# Patient Record
Sex: FEMALE | Race: WHITE
[De-identification: names, ages, dates, MRNs, and addresses within clinical notes are randomized per-mention and may not be internally consistent; named-entity substitution may affect disease eponyms.]

---

## 2018-04-10 ENCOUNTER — HOSPITAL ENCOUNTER (INPATIENT)
Dept: HOSPITAL 17 - NEPC | Age: 83
LOS: 1 days | Discharge: HOME HEALTH | DRG: 563 | End: 2018-04-11
Attending: HOSPITALIST | Admitting: HOSPITALIST
Payer: MEDICARE

## 2018-04-10 VITALS — BODY MASS INDEX: 26.56 KG/M2 | HEIGHT: 63 IN | WEIGHT: 149.91 LBS

## 2018-04-10 VITALS
OXYGEN SATURATION: 98 % | TEMPERATURE: 98.3 F | RESPIRATION RATE: 18 BRPM | SYSTOLIC BLOOD PRESSURE: 168 MMHG | DIASTOLIC BLOOD PRESSURE: 74 MMHG | HEART RATE: 91 BPM

## 2018-04-10 DIAGNOSIS — Z96.643: ICD-10-CM
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
## 2018-04-10 DIAGNOSIS — Z96.653: ICD-10-CM

## 2018-04-10 DIAGNOSIS — F03.90: ICD-10-CM

## 2018-04-10 DIAGNOSIS — S42.201A: Primary | ICD-10-CM

## 2018-04-10 DIAGNOSIS — I10: ICD-10-CM

## 2018-04-10 DIAGNOSIS — M19.90: ICD-10-CM
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
## 2018-04-10 PROCEDURE — 85730 THROMBOPLASTIN TIME PARTIAL: CPT

## 2018-04-10 PROCEDURE — 73030 X-RAY EXAM OF SHOULDER: CPT

## 2018-04-10 PROCEDURE — 86900 BLOOD TYPING SEROLOGIC ABO: CPT

## 2018-04-10 PROCEDURE — 71045 X-RAY EXAM CHEST 1 VIEW: CPT

## 2018-04-10 PROCEDURE — 86850 RBC ANTIBODY SCREEN: CPT

## 2018-04-10 PROCEDURE — 85610 PROTHROMBIN TIME: CPT

## 2018-04-10 PROCEDURE — 80048 BASIC METABOLIC PNL TOTAL CA: CPT

## 2018-04-10 PROCEDURE — 86901 BLOOD TYPING SEROLOGIC RH(D): CPT

## 2018-04-10 PROCEDURE — 99285 EMERGENCY DEPT VISIT HI MDM: CPT

## 2018-04-10 PROCEDURE — 85025 COMPLETE CBC W/AUTO DIFF WBC: CPT

## 2018-04-11 VITALS
SYSTOLIC BLOOD PRESSURE: 138 MMHG | TEMPERATURE: 98 F | RESPIRATION RATE: 19 BRPM | OXYGEN SATURATION: 98 % | HEART RATE: 53 BPM | DIASTOLIC BLOOD PRESSURE: 62 MMHG

## 2018-04-11 VITALS
TEMPERATURE: 97.6 F | RESPIRATION RATE: 20 BRPM | HEART RATE: 65 BPM | SYSTOLIC BLOOD PRESSURE: 165 MMHG | OXYGEN SATURATION: 98 % | DIASTOLIC BLOOD PRESSURE: 73 MMHG

## 2018-04-11 VITALS
OXYGEN SATURATION: 97 % | RESPIRATION RATE: 16 BRPM | HEART RATE: 60 BPM | TEMPERATURE: 97.9 F | SYSTOLIC BLOOD PRESSURE: 159 MMHG | DIASTOLIC BLOOD PRESSURE: 71 MMHG

## 2018-04-11 LAB
BASOPHILS # BLD AUTO: 0 TH/MM3 (ref 0–0.2)
BASOPHILS NFR BLD: 0.4 % (ref 0–2)
BUN SERPL-MCNC: 23 MG/DL (ref 7–18)
CALCIUM SERPL-MCNC: 9.3 MG/DL (ref 8.5–10.1)
CHLORIDE SERPL-SCNC: 104 MEQ/L (ref 98–107)
CREAT SERPL-MCNC: 0.76 MG/DL (ref 0.5–1)
EOSINOPHIL # BLD: 0.1 TH/MM3 (ref 0–0.4)
EOSINOPHIL NFR BLD: 0.7 % (ref 0–4)
ERYTHROCYTE [DISTWIDTH] IN BLOOD BY AUTOMATED COUNT: 13.6 % (ref 11.6–17.2)
GFR SERPLBLD BASED ON 1.73 SQ M-ARVRAT: 72 ML/MIN (ref 89–?)
GLUCOSE SERPL-MCNC: 136 MG/DL (ref 74–106)
HCO3 BLD-SCNC: 28.1 MEQ/L (ref 21–32)
HCT VFR BLD CALC: 38.5 % (ref 35–46)
HGB BLD-MCNC: 13 GM/DL (ref 11.6–15.3)
INR PPP: 1 RATIO
LYMPHOCYTES # BLD AUTO: 0.8 TH/MM3 (ref 1–4.8)
LYMPHOCYTES NFR BLD AUTO: 7.9 % (ref 9–44)
MCH RBC QN AUTO: 29.9 PG (ref 27–34)
MCHC RBC AUTO-ENTMCNC: 33.8 % (ref 32–36)
MCV RBC AUTO: 88.6 FL (ref 80–100)
MONOCYTE #: 0.5 TH/MM3 (ref 0–0.9)
MONOCYTES NFR BLD: 4.4 % (ref 0–8)
NEUTROPHILS # BLD AUTO: 9.2 TH/MM3 (ref 1.8–7.7)
NEUTROPHILS NFR BLD AUTO: 86.6 % (ref 16–70)
PLATELET # BLD: 175 TH/MM3 (ref 150–450)
PMV BLD AUTO: 8.8 FL (ref 7–11)
PROTHROMBIN TIME: 10.4 SEC (ref 9.8–11.6)
RBC # BLD AUTO: 4.34 MIL/MM3 (ref 4–5.3)
SODIUM SERPL-SCNC: 139 MEQ/L (ref 136–145)
WBC # BLD AUTO: 10.6 TH/MM3 (ref 4–11)

## 2018-04-11 NOTE — PD.ORT.PN
Subjective


Subjective Remarks


s/p fall while square dancing


right shoulder pain. no other complaints.





Objective


Vitals





Vital Signs








  Date Time  Temp Pulse Resp B/P (MAP) Pulse Ox O2 Delivery O2 Flow Rate FiO2


 


4/11/18 06:06        


 


4/11/18 05:24 97.6 65 20 165/73 (103) 98   


 


4/10/18 22:15 98.3 91 18 168/74 (105) 98   








Result Diagram:  


4/11/18 0410                                                                   

             4/11/18 0410





Other Results





Laboratory Tests








Test


  4/11/18


04:10


 


Prothromb Time International


Ratio 1.0 RATIO 


 


 


Prothrombin Time


  10.4 SEC


(9.8-11.6)








Imaging





Last 24 hours Impressions








Chest X-Ray 4/11/18 0000 Signed





Impressions: 





 Service Date/Time:  Wednesday, April 11, 2018 03:44 - CONCLUSION:  No acute 





 disease.  Cardiomegaly.     Eleno Kwan MD 


 


Shoulder X-Ray 4/10/18 2221 Signed





Impressions: 





 Service Date/Time:  Wednesday, April 11, 2018 00:24 - CONCLUSION:  Displaced 





 fracture proximal humeral shaft.     Eleno Kwan MD 








Objective Remarks


RUE: +sling. nvi distally with full senation to median/ulnar nerve. full 

extension of wrist and fingers





Assessment & Plan


Assessment and Plan


1) Right Proximal Humerus Fx


   -plan for nonop treatment


   -NWb


   -sling at all times


   -f/u wtih Velazco next thursday to eval alignment.


   -if maintains alignment, plan for nonop treatment


   -ortho clear for Dc


   -official consult to follow











Lawrence Caruso/First Assist PA Apr 11, 2018 07:32

## 2018-04-11 NOTE — RADRPT
EXAM DATE/TIME:  04/11/2018 00:24 

 

[facility] COMPARISON:     

No previous studies available for comparison.

 

                     

INDICATIONS :     

Fell dancing on right arm.

                     

 

MEDICAL HISTORY :     

None.          

 

SURGICAL HISTORY :     

None.   

 

ENCOUNTER:     

Initial                                        

 

ACUITY:     

1 day      

 

PAIN SCORE:     

9/10

 

LOCATION:     

Right  shoulder

 

FINDINGS:     

Two view examination of the right shoulder demonstrates a displaced fracture or proximal shaft right 
humerus. Humeral head continues to articulate with the glenoid. Bony mineralization is normal.

 

CONCLUSION:     

Displaced fracture proximal humeral shaft.

 

 

 

 Eleno Kwan MD on April 11, 2018 at 0:38           

Board Certified Radiologist.

 This report was verified electronically.

## 2018-04-11 NOTE — MB
cc:

Lawrence Caruso PA/[facility]ist

Lawrence Caruso/Eddi

****

 

 

DATE:

04/11/2018

 

DATE OF SERVICE:

04/11/2018.

 

HISTORY OF PRESENT ILLNESS:

The patient is an 88-year-old white female who presents today after 

suffering a fall while dancing.  She states she fell on her right side

and had immediate right shoulder pain.  She states that since then she

has been unable to move her arm.  She states that she has been in her 

sling since then.  She was brought to the emergency department where 

she was diagnosed with a right proximal humerus fracture.  She states 

that she has pain in the right arm.  She denies pain anywhere else.  

She says her legs and left arm are feeling quite good.  She states she

is able to walk.  She denies any numbness, tingling or radiation of 

symptoms.  Denies any loss of consciousness or dizziness.  She states 

that she experiences increased pain whenever she attempts to move her 

arm.  She states that she and her  are actually from [place]

and will be traveling back there at the beginning of May.

 

REVIEW OF SYSTEMS:

A complete 9-point review of systems is negative, except for what is 

mentioned in the HPI.

 

PAST MEDICAL HISTORY:

Positive for hypertension, mild dementia.

 

PAST SURGICAL HISTORY:

Bilateral knee surgery and hip surgery, five C-sections.

 

SOCIAL HISTORY:

Drinks wine occasionally.  Denies tobacco or substance abuse.

 

ALLERGIES:

NO KNOWN ALLERGIES.

 

REPORTED MEDICATIONS:

1.  MiraLax.

2.  Calcium.

3.  Aspirin.

4.  Metoprolol.

 

PHYSICAL EXAMINATION:

VITAL SIGNS:  Temperature 97.9, pulse 60 beats per minute, respiratory

rate 16, blood pressure 159/71, O2 saturation 97% on room air.

GENERAL:  Well-developed, well-nourished 88-year-old white female, 

resting comfortably, in no acute distress.

HEAD:  Normocephalic, atraumatic.

EARS:  Hearing intact bilaterally.

EYES:  Extraocular motions intact.  Pupils equal, round and reactive 

to light.

Cranial nerves 2-12 are grossly intact.

NECK:  Supple.  No evidence of lymphadenopathy.

LUNGS:  No audible wheezes at bedside and no use of accessory muscles 

of breathing.

HEART:  No grade 4 murmur present at bedside.

ABDOMEN:  Soft, nontender.

MUSCULOSKELETAL:  Right upper extremity sling is present.  There is 

bruising and swelling noted in the right arm.  She has pain with 

movement of the shoulder.  She has full motion of the elbow, wrist and

fingers with minimal discomfort with full sensation to median and 

ulnar nerve distribution and full extension of the wrist and fingers.

 

The left upper extremity shows full motion of shoulder, elbow, wrist, 

and fingers and no pain with full sensation distally.

 

LABORATORIES:

White blood cell count 10.6, red blood cell count 4.34, hemoglobin 13,

hematocrit 38.5.

INR 1.0.

 

IMAGING:

X-rays were reviewed from [facility], which include a 

2-view x-ray of the right shoulder which shows a minimally displaced 

right proximal humerus fracture.

 

ASSESSMENT:

Right proximal humerus fracture.

 

PLAN:

At this point, treatment options were discussed with the patient, 

which include surgical versus nonsurgical options.  Given her age and 

how well the fracture is aligned, I would recommend nonoperative 

management at this time.  I informed the patient and her  that 

proximal humerus fractures do tend to heal well.  This is something 

that should do well with conservative management.  She will remain 

100% nonweightbearing on the right arm.  She will maintain her sling 

at all times.  She will avoid any motion.

 

She will follow up with us next Thursday for a recheck and x-rays of 

the right shoulder.  I informed her that if it does happen to displace

it could necessitate surgical intervention.  However, if it maintains 

its current alignment, this should do well conservatively.  She may be

discharged from an orthopedic standpoint today and we will be signing 

off and following up with her in a week.  The patient understood and 

all questions were answered.

 

The above patient and dictation was reviewed and discussed with Dr. Velazco and he agrees.

 

 

__________________________________ __________________________________

CLARICE Jha, PA/First Chandana Velazco MD

Assist

 

 

TYB/SB

D: 04/11/2018, 10:46 AM

T: 04/11/2018, 11:13 AM

Visit #: [id number]

Job #: [id number]

## 2018-04-11 NOTE — HHI.HP
__________________________________________________





[facility]


Service


[facility]


Primary Care Physician


No Primary Care Physician


Admission Diagnosis





R humerus fracture


Diagnoses:  


Travel History


International Travel<30 Days:  No


Contact w/Intl Traveler <30 Da:  No


Traveled to Known Affected Are:  No


History of Present Illness


Patient fell yesterday evening, while line dancing with her .  She was 

drooling, lost balance.  She denies any loss of consciousness.  Denies hitting 

her head.  Constant nonradiating, sharp pain in the right shoulder, improved 

with narcotics.  She denies any chest pain, shortness of breath, nausea, 

vomiting, diarrhea, dysuria, cough, cold.  She denies chronic constipation 

without acute exacerbation.  She says she otherwise feels all right.





Review of Systems


Except as stated in HPI:  all other systems reviewed are Neg





Past Family Social History


Past Medical History


Hypertension


Chronic constipation.


Osteoarthritis


Past Surgical History


Bilateral knee replacements


Bilateral hip replacements


 5


Reported Medications


Reported Meds & Active Scripts


Active


Reported


Miralax Powder (Polyethylene Glycol 3350 Powder) 17 Gm Powd 17 Gm PO DAILY


     Mix and dissolve one measuring cap-ful (17 grams) in water or juice.


Calcium 600+D 200 (Calcium Carbonate-Vitamin D) 600-200 Mg-Unit Tab 1 Tab PO BID


Aspirin EC (Aspirin) 325 Mg Tabdr 325 Mg PO DAILY


Metoprolol Tartrate 25 Mg Tab 25 Mg PO DAILY


Allergies:  


Coded Allergies:  


     No Known Allergies (Unverified , 4/10/18)


Family History


Patient reports both parents  in their late 90s secondary to old age.


Social History





Non-smoker.  Occasional drinker.  Denies any illicit drugs.





Physical Exam


Vital Signs





Vital Signs








  Date Time  Temp Pulse Resp B/P (MAP) Pulse Ox O2 Delivery O2 Flow Rate FiO2


 


18 12:00 98.0 53 19 138/62 (87) 98   


 


18 08:00 97.9 60 16 159/71 (100) 97   


 


18 06:06        


 


18 05:24 97.6 65 20 165/73 (103) 98   


 


4/10/18 22:15 98.3 91 18 168/74 (105) 98   








Physical Exam


GENERAL: This is a well-nourished, well-developed patient, in no apparent 

distress.


SKIN: No rashes, ecchymoses or lesions. Cool and dry.


HEAD: Atraumatic. Normocephalic. No temporal or scalp tenderness.


EYES: Pupils equal round and reactive. Extraocular motions intact. No scleral 

icterus. No injection or drainage. 


ENT: Nose without bleeding, purulent drainage or septal hematoma. Throat 

without erythema, tonsillar hypertrophy or exudate. Uvula midline. Airway 

patent.


NECK: Trachea midline. No JVD or lymphadenopathy. Supple, nontender, no 

meningeal signs.


CARDIOVASCULAR: Regular rate and rhythm without murmurs, gallops, or rubs. 


RESPIRATORY: Clear to auscultation. Breath sounds equal bilaterally. No wheezes

, rales, or rhonchi.  


GASTROINTESTINAL: Abdomen soft, non-tender, nondistended. No hepato-splenomegaly

, or palpable masses. No guarding.


MUSCULOSKELETAL: Extremities without clubbing, cyanosis. No joint tenderness, 

effusion.  1+ bilateral lower extremity edema without any erythema.  Patient 

says this is chronic.  No calf tenderness. Negative Homans sign bilaterally.  

Right shoulder immobilized, no tenderness to light palpation.  Hand strength 

intact.  Peripheral perfusion intact.


NEUROLOGICAL: Awake and alert. Cranial nerves II through XII intact.  Motor and 

sensory grossly within normal limits. Five out of 5 muscle strength in 

bilateral lower extremities.  Normal speech.


Laboratory





Laboratory Tests








Test


  18


04:10


 


White Blood Count 10.6 


 


Red Blood Count 4.34 


 


Hemoglobin 13.0 


 


Hematocrit 38.5 


 


Mean Corpuscular Volume 88.6 


 


Mean Corpuscular Hemoglobin 29.9 


 


Mean Corpuscular Hemoglobin


Concent 33.8 


 


 


Red Cell Distribution Width 13.6 


 


Platelet Count 175 


 


Mean Platelet Volume 8.8 


 


Neutrophils (%) (Auto) 86.6 


 


Lymphocytes (%) (Auto) 7.9 


 


Monocytes (%) (Auto) 4.4 


 


Eosinophils (%) (Auto) 0.7 


 


Basophils (%) (Auto) 0.4 


 


Neutrophils # (Auto) 9.2 


 


Lymphocytes # (Auto) 0.8 


 


Monocytes # (Auto) 0.5 


 


Eosinophils # (Auto) 0.1 


 


Basophils # (Auto) 0.0 


 


CBC Comment DIFF FINAL 


 


Differential Comment  


 


Prothrombin Time 10.4 


 


Prothromb Time International


Ratio 1.0 


 


 


Activated Partial


Thromboplast Time 25.2 


 


 


Blood Urea Nitrogen 23 


 


Creatinine 0.76 


 


Random Glucose 136 


 


Calcium Level 9.3 


 


Sodium Level 139 


 


Potassium Level 3.8 


 


Chloride Level 104 


 


Carbon Dioxide Level 28.1 


 


Anion Gap 7 


 


Estimat Glomerular Filtration


Rate 72 


 








Result Diagram:  


18








Caprini VTE Risk Assessment


Caprini VTE Risk Assessment:  Mod/High Risk (score >= 2)


Caprini Risk Assessment Model











 Point Value = 1          Point Value = 2  Point Value = 3  Point Value = 5


 


Age 41-60


Minor surgery


BMI > 25 kg/m2


Swollen legs


Varicose veins


Pregnancy or postpartum


History of unexplained or recurrent


   spontaneous 


Oral contraceptives or hormone


   replacement


Sepsis (< 1 month)


Serious lung disease, including


   pneumonia (< 1 month)


Abnormal pulmonary function


Acute myocardial infarction


Congestive heart failure (< 1 month)


History of inflammatory bowel disease


Medical patient at bed rest Age 61-74


Arthroscopic surgery


Major open surgery (> 45 min)


Laparoscopic surgery (> 45 min)


Malignancy


Confined to bed (> 72 hours)


Immobilizing plaster cast


Central venous access Age >= 75


History of VTE


Family history of VTE


Factor V Leiden


Prothrombin 82963K


Lupus anticoagulant


Anticardiolipin antibodies


Elevated serum homocysteine


Heparin-induced thrombocytopenia


Other congenital or acquired


   thrombophilia Stroke (< 1 month)


Elective arthroplasty


Hip, pelvis, or leg fracture


Acute spinal cord injury (< 1 month)








Prophylaxis Regimen











   Total Risk


Factor Score Risk Level Prophylaxis Regimen


 


0-1      Low Early ambulation


 


2 Moderate Order ONE of the following:


*Sequential Compression Device (SCD)


*Heparin 5000 units SQ BID


 


3-4 Higher Order ONE of the following medications:


*Heparin 5000 units SQ TID


*Enoxaparin/Lovenox 40 mg SQ daily (WT < 150 kg, CrCl > 30 mL/min)


*Enoxaparin/Lovenox 30 mg SQ daily (WT < 150 kg, CrCl > 10-29 mL/min)


*Enoxaparin/Lovenox 30 mg SQ BID (WT < 150 kg, CrCl > 30 mL/min)


AND/OR


*Sequential Compression Device (SCD)


 


5 or more Highest Order ONE of the following medications:


*Heparin 5000 units SQ TID (Preferred with Epidurals)


*Enoxaparin/Lovenox 40 mg SQ daily (WT < 150 kg, CrCl > 30 mL/min)


*Enoxaparin/Lovenox 30 mg SQ daily (WT < 150 kg, CrCl > 10-29 mL/min)


*Enoxaparin/Lovenox 30 mg SQ BID (WT < 150 kg, CrCl > 30 mL/min)


AND


*Sequential Compression Device (SCD)











Assessment and Plan


Assessment and Plan


//Right humerus fracture.  Appreciate orthopedic surgery assistance.  Continue 

in sling.  Follow-up with orthopedics as outpatient.  Nonsurgical management 

planned.





//Suspected osteoporosis.  Patient will follow up with primary care, likely 

need to start on bisphosphonate in 6 months.  Discussed with patient and 

, who convey understanding





//Chronic hypertension.  Blood pressure acceptable.  Continue home medications.





//DVT prophylaxis.  Patient is ambulatory.


Discussed Condition With


Patient, nurse





Physician Certification


2 Midnight Certification Type:  Continued Stay


Order for Inpatient Services


She will be discharged this afternoon.


Estimated LOS (days):  1


day


Post-Hospital Plan:  Home











Jonnathan Whaley MD 2018 14:29

## 2018-04-11 NOTE — HHI.FF
Face to Face Verification


Diagnosis:  


(1) Humerus fracture


Physical Therapy


Order:  Evaluate and Treat





Occupational Therapy


Order:  Evaluate and Treat








Order: To Provide:  Long range planning











I have seen patient Renee Meza on 4/11/18. My clinical findings support 

the need for the requested home health care services because:








 Limited ability to care for self














I certify that my clinical findings support that this patient is homebound 

because:








 Unsafe to leave home unassisted

















Jonnathan Whaley MD Apr 11, 2018 13:52

## 2018-04-11 NOTE — PD
HPI


Chief Complaint:  Injury


Time Seen by Provider:  01:55


Travel History


International Travel<30 days:  No


Contact w/Intl Traveler<30days:  No


Traveled to known affect area:  No





History of Present Illness


HPI


88-year-old female presents to the emergency department by private 

transportation for evaluation of right arm injury.  According the patient just 

prior to arrival to the emergency department while out dancing with her  

and her shortness of breath did not injure her abdomen pelvis left upper 

extremity or lower extremity's.  Patient is in good health takes metoprolol for 

high blood pressure.  [facility] and lives normally 

in [place] but is here until May 5.  Patient takes no blood thinning 

agents.  Patient is very active and goes dancing with her  once a week.  

Patient rates pain as moderate to severe worsens with attempted movement.  

Patient is taken no pain medication for her symptoms.





PFSH


Past Medical History


*** Narrative Medical


Hypertension mild dementia; bilateral knee surgery hip surgery ; 

nursing notes reviewed


Medical History:  Denies Significant Hx


Diminished Hearing:  No


Tetanus Vaccination:  < 5 Years


Influenza Vaccination:  Yes


Pregnant?:  Not Pregnant


LMP:  menapause





Past Surgical History


 Section:  Yes (x5)





Social History


Alcohol Use:  Yes (wine occasionally)


Tobacco Use:  No


Substance Use:  No





Allergies-Medications


(Allergen,Severity, Reaction):  


Coded Allergies:  


     No Known Allergies (Unverified , 4/10/18)


Reported Meds & Prescriptions





Reported Meds & Active Scripts


Active


Reported


Miralax Powder (Polyethylene Glycol 3350 Powder) 17 Gm Powd 17 Gm PO DAILY


     Mix and dissolve one measuring cap-ful (17 grams) in water or juice.


Calcium 600+D 200 (Calcium Carbonate-Vitamin D) 600-200 Mg-Unit Tab 1 Tab PO BID


Aspirin EC (Aspirin) 325 Mg Tabdr 325 Mg PO DAILY


Metoprolol Tartrate 25 Mg Tab 25 Mg PO DAILY





Review of Systems


Except as stated in HPI:  all other systems reviewed are Neg





Physical Exam


Narrative


GENERAL: Well-developed well-nourished pleasant elderly female in no acute 

distress no respiratory distress


SKIN: Warm and dry.


HEAD: Normocephalic.  Atraumatic.  No scalp soft tissue swelling abrasion or 

bony abnormality.


EYES: No scleral icterus. No injection or drainage. 


NECK: Supple, trachea midline. No JVD or lymphadenopathy.  No midline 

tenderness to direct palpation along the cervical spine.  No bony step-off.


CARDIOVASCULAR: Regular rate and rhythm without murmurs, gallops, or rubs. 


RESPIRATORY: Breath sounds equal bilaterally. No accessory muscle use.


GASTROINTESTINAL: Abdomen soft, non-tender, nondistended. 


MUSCULOSKELETAL: No cyanosis, or edema.  Soft tissue swelling to the right 

upper arm distally extremities neurovascular tendon intact with brisk capillary 

refill per digit radial pulses are 2+ to palpation ulnar pulses 2+ to palpation 

patient has intact thumb apposition.  No abrasion or laceration.


BACK: Nontender without obvious deformity. No CVA tenderness.








Data


Data


Last Documented VS





Vital Signs








  Date Time  Temp Pulse Resp B/P (MAP) Pulse Ox O2 Delivery O2 Flow Rate FiO2


 


4/10/18 22:15 98.3 91 18 168/74 (105) 98   








Orders





 Orders


Ice/Cold Pack (4/10/18 22:21)


Shoulder, Limited(2vws) (4/10/18 22:21)


Splint Or Brace Apply/Monitor (18 01:55)


Sling And Swathe (18 )


Admit Order (Ed Use Only) (18 )


Vital Signs (Adult) Q4H (18 03:15)


Diet Npo (18 Breakfast)


Activity Oob With Assistance (18 03:15)


Notify Dr: Other (18 03:15)


Consult Orthopedic (18 )


Complete Blood Count With Diff (18 03:17)


Basic Metabolic Panel (Bmp) (18 03:17)


Act Partial Throm Time (Ptt) (18 03:17)


Prothrombin Time / Inr (Pt) (18 03:17)


Type And Screen (18 03:17)


Electrocardiogram (18 )


Chest, Single Ap (18 )








MDM


Medical Decision Making


Medical Screen Exam Complete:  Yes


Emergency Medical Condition:  Yes


Medical Record Reviewed:  Yes


Interpretation(s)





Last Impressions








Chest X-Ray 18 0000 Signed





Impressions: 





 Service Date/Time:  2018 03:44 - CONCLUSION:  No acute 





 disease.  Cardiomegaly.     Eleno Kwan MD 


 


Shoulder X-Ray 4/10/18 2221 Signed





Impressions: 





 Service Date/Time:  2018 00:24 - CONCLUSION:  Displaced 





 fracture proximal humeral shaft.     Eleno Kwan MD 








Vital Signs








  Date Time  Temp Pulse Resp B/P (MAP) Pulse Ox O2 Delivery O2 Flow Rate FiO2


 


4/10/18 22:15 98.3 91 18 168/74 (105) 98   








Differential Diagnosis


Sprain strain fracture subluxation dislocation


Narrative Course


IV access obtained specimens collected and sent for resulting imaging studies 

study


Imaging of the right humerus reveals a displaced proximal humeral fracture 

without dislocation.


Patient's case discussed with on-call orthopedist Dr. Valerio has had the 

opportunity to review the imaging study and recommends observation admission to 

medicine with consult to Dr. Velazco





Physician Communication


Physician Communication


discussed with Dr Valerio; discussed with DR Parry -- Admit





Diagnosis





 Primary Impression:  


 Humerus fracture


 Qualified Codes:  S42.291A - Other displaced fracture of upper end of right 

humerus, initial encounter for closed fracture





Admitting Information


Admitting Physician Requests:  Admit











Mary Ann Ballard MD 2018 05:04

## 2018-04-11 NOTE — RADRPT
EXAM DATE/TIME:  04/11/2018 03:44 

 

[facility] COMPARISON:     

No previous studies available for comparison.

 

                     

INDICATIONS :     

Evaluate for pneumonia, pneumothorax or communicable disease. Pre op right humerus. 

                     

 

MEDICAL HISTORY :     

None.          

 

SURGICAL HISTORY :     

None.   

 

ENCOUNTER:     

Initial                                        

 

ACUITY:     

1 day      

 

PAIN SCORE:     

4/10

 

LOCATION:     

Bilateral chest 

 

FINDINGS:     

A single view of the chest demonstrates the lungs to be symmetrically aerated without evidence of mas
s, infiltrate or effusion.  The cardiomediastinal contours are unremarkable. Mild cardiomegaly. [place]
us structures are intact.

 

CONCLUSION:     

No acute disease.  Cardiomegaly.

 

 

 

 Eleno Kwan MD on April 11, 2018 at 4:21           

Board Certified Radiologist.

 This report was verified electronically.

## 2021-01-21 ENCOUNTER — NEW SPOT (OUTPATIENT)
Dept: URBAN - METROPOLITAN AREA CLINIC 25 | Facility: CLINIC | Age: 86
Setting detail: DERMATOLOGY
End: 2021-01-21

## 2021-01-21 DIAGNOSIS — Z48.817 ENCOUNTER FOR SURGICAL AFTERCARE FOLLOWING SURGERY ON THE SKIN AND SUBCUTANEOUS TISSUE: ICD-10-CM

## 2021-01-21 PROBLEM — C44.619 BASAL CELL CARCINOMA OF SKIN OF LEFT UPPER LIMB, INCLUDING SHOULDER: Status: ACTIVE | Noted: 2021-01-21

## 2021-01-21 PROBLEM — C44.619 BASAL CELL CARCINOMA OF SKIN OF LEFT UPPER LIMB, INCLUDING SHOULDER: Status: RESOLVED | Noted: 2021-01-21

## 2021-01-21 PROBLEM — D04.62 CARCINOMA IN SITU OF SKIN OF LEFT UPPER LIMB, INCLUDING SHOULDER: Status: RESOLVED | Noted: 2021-01-21

## 2021-01-21 PROBLEM — D04.5 CARCINOMA IN SITU OF SKIN OF TRUNK: Status: ACTIVE | Noted: 2021-01-21

## 2021-01-21 PROBLEM — D04.62 CARCINOMA IN SITU OF SKIN OF LEFT UPPER LIMB, INCLUDING SHOULDER: Status: ACTIVE | Noted: 2021-01-21

## 2021-01-21 PROBLEM — D04.5 CARCINOMA IN SITU OF SKIN OF TRUNK: Status: RESOLVED | Noted: 2021-01-21

## 2021-01-21 PROCEDURE — 11103 TANGNTL BX SKIN EA SEP/ADDL: CPT

## 2021-01-21 PROCEDURE — 17261 DSTRJ MAL LES T/A/L .6-1.0CM: CPT

## 2021-01-21 PROCEDURE — 88342 IMHCHEM/IMCYTCHM 1ST ANTB: CPT

## 2021-01-21 PROCEDURE — 17271 DSTR MAL LES S/N/H/F/G 0.6-1: CPT

## 2021-01-21 PROCEDURE — 88331 PATH CONSLTJ SURG 1 BLK 1SPC: CPT

## 2021-01-21 PROCEDURE — 11102 TANGNTL BX SKIN SINGLE LES: CPT

## 2021-01-21 PROCEDURE — 17262 DSTRJ MAL LES T/A/L 1.1-2.0: CPT

## 2021-01-21 PROCEDURE — 99204 OFFICE O/P NEW MOD 45 MIN: CPT

## 2022-01-20 ENCOUNTER — 1 YR (OUTPATIENT)
Dept: URBAN - METROPOLITAN AREA CLINIC 25 | Facility: CLINIC | Age: 87
Setting detail: DERMATOLOGY
End: 2022-01-20

## 2022-01-20 DIAGNOSIS — L82.0 INFLAMED SEBORRHEIC KERATOSIS: ICD-10-CM

## 2022-01-20 PROCEDURE — 88331 PATH CONSLTJ SURG 1 BLK 1SPC: CPT

## 2022-01-20 PROCEDURE — 11103 TANGNTL BX SKIN EA SEP/ADDL: CPT

## 2022-01-20 PROCEDURE — 17262 DSTRJ MAL LES T/A/L 1.1-2.0: CPT

## 2022-01-20 PROCEDURE — 99214 OFFICE O/P EST MOD 30 MIN: CPT

## 2022-01-20 PROCEDURE — 17263 DSTRJ MAL LES T/A/L 2.1-3.0: CPT

## 2022-01-20 PROCEDURE — 11102 TANGNTL BX SKIN SINGLE LES: CPT
